# Patient Record
Sex: FEMALE | Race: WHITE | HISPANIC OR LATINO | Employment: PART TIME | URBAN - METROPOLITAN AREA
[De-identification: names, ages, dates, MRNs, and addresses within clinical notes are randomized per-mention and may not be internally consistent; named-entity substitution may affect disease eponyms.]

---

## 2017-04-18 ENCOUNTER — APPOINTMENT (OUTPATIENT)
Dept: MEDICAL GROUP | Facility: MEDICAL CENTER | Age: 42
End: 2017-04-18

## 2017-09-07 ENCOUNTER — HOSPITAL ENCOUNTER (OUTPATIENT)
Facility: MEDICAL CENTER | Age: 42
End: 2017-09-07
Attending: NURSE PRACTITIONER
Payer: COMMERCIAL

## 2017-09-07 ENCOUNTER — OFFICE VISIT (OUTPATIENT)
Dept: MEDICAL GROUP | Facility: MEDICAL CENTER | Age: 42
End: 2017-09-07
Payer: COMMERCIAL

## 2017-09-07 VITALS
RESPIRATION RATE: 16 BRPM | BODY MASS INDEX: 33.44 KG/M2 | HEIGHT: 57 IN | WEIGHT: 155 LBS | HEART RATE: 74 BPM | SYSTOLIC BLOOD PRESSURE: 142 MMHG | OXYGEN SATURATION: 98 % | DIASTOLIC BLOOD PRESSURE: 90 MMHG | TEMPERATURE: 98 F

## 2017-09-07 DIAGNOSIS — E66.9 OBESITY (BMI 30-39.9): ICD-10-CM

## 2017-09-07 DIAGNOSIS — Z01.419 ENCOUNTER FOR ANNUAL ROUTINE GYNECOLOGICAL EXAMINATION: ICD-10-CM

## 2017-09-07 DIAGNOSIS — Z12.39 ENCOUNTER FOR OTHER SCREENING FOR MALIGNANT NEOPLASM OF BREAST: ICD-10-CM

## 2017-09-07 DIAGNOSIS — R03.0 ELEVATED BLOOD PRESSURE READING: ICD-10-CM

## 2017-09-07 PROCEDURE — 88175 CYTOPATH C/V AUTO FLUID REDO: CPT

## 2017-09-07 PROCEDURE — 87624 HPV HI-RISK TYP POOLED RSLT: CPT

## 2017-09-07 PROCEDURE — 99386 PREV VISIT NEW AGE 40-64: CPT | Performed by: NURSE PRACTITIONER

## 2017-09-07 ASSESSMENT — PATIENT HEALTH QUESTIONNAIRE - PHQ9: CLINICAL INTERPRETATION OF PHQ2 SCORE: 0

## 2017-09-07 NOTE — ASSESSMENT & PLAN NOTE
Blood pressure in the office today 142/90 on initial reading, repeat of 138/87  No history of hypertension, denies family history of hypertension  She has occasional headaches but nothing severe. Denies epistaxis, chest pain, palpitations

## 2017-09-07 NOTE — PROGRESS NOTES
CC:  Pap/Well Woman Exam    History of present illness:  Angelita Coe is 41 y.o.  female presenting today for well woman exam with gynecological exam and Pap smear. Last Pap smear was about 4 years ago and normal, no history of abnormal findings. Menstrual cycle is regular, monthly, last menstrual period 2 weeks ago. She is , monogamous, has had a tubal ligation. We discussed:    .Elevated blood pressure reading  Blood pressure in the office today 142/90 on initial reading, repeat of 138/87  No history of hypertension, denies family history of hypertension  She has occasional headaches but nothing severe. Denies epistaxis, chest pain, palpitations    Obesity (BMI 30-39.9)  Feels that her diet use improvement, not doing any regular exercise    She has not had mammogram  Past Medical History:   Diagnosis Date   • H/O tubal ligation        Past Surgical History:   Procedure Laterality Date   • TUBAL COAGULATION LAPAROSCOPIC BILATERAL         Outpatient Encounter Prescriptions as of 2017   Medication Sig Dispense Refill   • [DISCONTINUED] oxycodone-acetaminophen (PERCOCET) 5-325 MG Tab Take 1-2 Tabs by mouth every four hours as needed. 20 Tab 0   • [DISCONTINUED] methylPREDNISolone (MEDROL) 4 MG Tab Take as per the package instructions. 21 Tab 0     No facility-administered encounter medications on file as of 2017.        Patient Active Problem List    Diagnosis Date Noted   • Obesity (BMI 30-39.9) 2017   • Elevated blood pressure reading 2017       .  Social History     Social History   • Marital status:      Spouse name: N/A   • Number of children: N/A   • Years of education: N/A     Occupational History   • Not on file.     Social History Main Topics   • Smoking status: Never Smoker   • Smokeless tobacco: Never Used   • Alcohol use Yes      Comment: OCC   • Drug use: No   • Sexual activity: Yes     Partners: Male     Other Topics Concern   • Not on file     Social History  "Narrative   • No narrative on file       Family History   Problem Relation Age of Onset   • Hyperlipidemia Mother          ROS: Denies Weight loss, fatigue, chest pain, SOB, bowel or bladder changes. No significant dysmenorrhea, concerning vaginal discharge or irritation, no dyspareunia or postcoital bleeding. Denies h/o migraine with aura. Denies musculoskeletal, neurological, or psychiatric problems.      /90   Pulse 74   Temp 36.7 °C (98 °F)   Resp 16   Ht 1.448 m (4' 9\")   Wt 70.3 kg (155 lb)   SpO2 98%   BMI 33.54 kg/m²     GEN:  Appears well and in no apparent distress   NECK:  Supple without adenopathy or thyromegaly  LUNGS:  Clear and equal. No wheeze, ronchi, or rales.  CV:  RRR, S1, S2. No murmur.  Pedal pulses 2+ bilaterally.  BREAST:  Symmetrical without masses. No nipple discharge.  ABD:  Soft, non-tender, non-distended, normal bowel sounds.  No hepatosplenomegaly.  :  Normal external female genitalia.  Vaginal canal clear.  Cervix appears normal. Specimen collected from transformation zone. Bimanual exam:  No CMT, normal size uterus without masses or tenderness; no adnexal masses or tenderness.      Assessment and plan    1. Encounter for annual routine gynecological examination  Normal GYN exam. Pap sent, follow-up results. Breast self-exam taught and encouraged monthly, mammogram as listed below. 2000-8 units vitamin D3 advise daily  - LIPID PROFILE; Future  - COMP METABOLIC PANEL; Future  - TSH WITH REFLEX TO FT4; Future  - THINPREP PAP WITH HPV; Future  2. Obesity (BMI 30-39.9)  - Patient identified as having weight management issue.  Appropriate orders and counseling given.  3. Elevated blood pressure reading  Borderline blood pressure readings in the office today. Encouraged to obtain a home monitor and begin checking periodically, notify me for readings persistently greater than 140 systolic  4. Encounter for other screening for malignant neoplasm of breast  - MA-SCREEN MAMMO " W/CAD-BILAT; Future      F/u pending results

## 2017-09-08 LAB
CYTOLOGY REG CYTOL: NORMAL
HPV HR 12 DNA CVX QL NAA+PROBE: NEGATIVE
HPV16 DNA SPEC QL NAA+PROBE: NEGATIVE
HPV18 DNA SPEC QL NAA+PROBE: NEGATIVE
SPECIMEN SOURCE: NORMAL

## 2017-09-11 ENCOUNTER — HOSPITAL ENCOUNTER (OUTPATIENT)
Dept: LAB | Facility: MEDICAL CENTER | Age: 42
End: 2017-09-11
Attending: NURSE PRACTITIONER
Payer: COMMERCIAL

## 2017-09-11 DIAGNOSIS — Z01.419 ENCOUNTER FOR ANNUAL ROUTINE GYNECOLOGICAL EXAMINATION: ICD-10-CM

## 2017-09-11 LAB
ALBUMIN SERPL BCP-MCNC: 4.2 G/DL (ref 3.2–4.9)
ALBUMIN/GLOB SERPL: 1.3 G/DL
ALP SERPL-CCNC: 55 U/L (ref 30–99)
ALT SERPL-CCNC: 18 U/L (ref 2–50)
ANION GAP SERPL CALC-SCNC: 8 MMOL/L (ref 0–11.9)
AST SERPL-CCNC: 14 U/L (ref 12–45)
BILIRUB SERPL-MCNC: 0.6 MG/DL (ref 0.1–1.5)
BUN SERPL-MCNC: 15 MG/DL (ref 8–22)
CALCIUM SERPL-MCNC: 9.5 MG/DL (ref 8.5–10.5)
CHLORIDE SERPL-SCNC: 103 MMOL/L (ref 96–112)
CHOLEST SERPL-MCNC: 161 MG/DL (ref 100–199)
CO2 SERPL-SCNC: 23 MMOL/L (ref 20–33)
CREAT SERPL-MCNC: 0.57 MG/DL (ref 0.5–1.4)
GFR SERPL CREATININE-BSD FRML MDRD: >60 ML/MIN/1.73 M 2
GLOBULIN SER CALC-MCNC: 3.2 G/DL (ref 1.9–3.5)
GLUCOSE SERPL-MCNC: 92 MG/DL (ref 65–99)
HDLC SERPL-MCNC: 66 MG/DL
LDLC SERPL CALC-MCNC: 81 MG/DL
POTASSIUM SERPL-SCNC: 3.9 MMOL/L (ref 3.6–5.5)
PROT SERPL-MCNC: 7.4 G/DL (ref 6–8.2)
SODIUM SERPL-SCNC: 134 MMOL/L (ref 135–145)
TRIGL SERPL-MCNC: 72 MG/DL (ref 0–149)

## 2017-09-11 PROCEDURE — 36415 COLL VENOUS BLD VENIPUNCTURE: CPT

## 2017-09-11 PROCEDURE — 84443 ASSAY THYROID STIM HORMONE: CPT

## 2017-09-11 PROCEDURE — 80053 COMPREHEN METABOLIC PANEL: CPT

## 2017-09-11 PROCEDURE — 80061 LIPID PANEL: CPT

## 2017-09-12 LAB — TSH SERPL DL<=0.005 MIU/L-ACNC: 2.1 UIU/ML (ref 0.3–3.7)

## 2018-08-29 ENCOUNTER — OFFICE VISIT (OUTPATIENT)
Dept: MEDICAL GROUP | Facility: MEDICAL CENTER | Age: 43
End: 2018-08-29
Payer: COMMERCIAL

## 2018-08-29 VITALS
OXYGEN SATURATION: 99 % | BODY MASS INDEX: 34.3 KG/M2 | WEIGHT: 159 LBS | HEIGHT: 57 IN | HEART RATE: 66 BPM | SYSTOLIC BLOOD PRESSURE: 122 MMHG | DIASTOLIC BLOOD PRESSURE: 88 MMHG | TEMPERATURE: 98.3 F | RESPIRATION RATE: 16 BRPM

## 2018-08-29 DIAGNOSIS — R03.0 ELEVATED BLOOD PRESSURE READING: ICD-10-CM

## 2018-08-29 DIAGNOSIS — Z12.31 ENCOUNTER FOR SCREENING MAMMOGRAM FOR BREAST CANCER: ICD-10-CM

## 2018-08-29 DIAGNOSIS — Z00.00 ANNUAL PHYSICAL EXAM: ICD-10-CM

## 2018-08-29 DIAGNOSIS — E66.9 OBESITY (BMI 30-39.9): ICD-10-CM

## 2018-08-29 PROCEDURE — 99396 PREV VISIT EST AGE 40-64: CPT | Performed by: NURSE PRACTITIONER

## 2018-08-29 ASSESSMENT — PATIENT HEALTH QUESTIONNAIRE - PHQ9: CLINICAL INTERPRETATION OF PHQ2 SCORE: 0

## 2018-08-29 NOTE — PROGRESS NOTES
"Chief Complaint   Patient presents with   • Stress     BLOOD PRESSURE CONCERNS/REGULAR CHECK UP     Angelita Coe is a 42 y.o. female here for annual exam. She is , has 4 children ages 13-20. She is working full-time. She recently has had some marital problems which are causing increased stress, she and her  are trying to work things out. We discussed:    Elevated blood pressure reading  Patient has a home blood pressure cuff and begin occasionally checking a few months ago. She reports home readings up to 170s systolic, this is about 6 months ago. Has not been checking recently. Has been very stressed. Mother with HTN, possibly father   Denies headaches, epistaxis, fatigue, dizziness, chest pain    Obesity (BMI 30-39.9)  Active but now planned exercise. States that her diet has been poor    Pap completed last year and normal  She has not had screening mammogram    Current medicines (including changes today)  No current outpatient prescriptions on file.     No current facility-administered medications for this visit.      She  has a past medical history of H/O tubal ligation.  She  has a past surgical history that includes tubal coagulation laparoscopic bilateral.  Social History   Substance Use Topics   • Smoking status: Never Smoker   • Smokeless tobacco: Never Used   • Alcohol use Yes      Comment: OCC     Social History     Social History Narrative   • No narrative on file     Family History   Problem Relation Age of Onset   • Hyperlipidemia Mother      Family Status   Relation Status   • Mo Alive   • Fa Alive   • Sis Alive   • Bro Alive   • Son Alive   • Dank Alive   • Dank Alive   • Dank Alive         ROS  Problems listed discussed above, all other systems reviewed and negative     Objective:     Blood pressure 122/88, pulse 66, temperature 36.8 °C (98.3 °F), resp. rate 16, height 1.448 m (4' 9\"), weight 72.1 kg (159 lb), SpO2 99 %. Body mass index is 34.41 kg/m².  Physical Exam:  General: Alert, " oriented in no acute distress.  Eye contact is good, speech is normal, affect calm  HEENT: perrl, Oral mucosa pink moist, no lesions. Nares patent. TMs gray with good landmarks bilaterally. No cervical or supraclavicular lymphadenopathy, thyroid isthmus palpable without masses or nodules.  Lungs: clear to auscultation bilaterally, good aeration, normal effort. No wheeze/ rhonchi/ rales.  CV: regular rate and rhythm, S1, S2. No murmur, no JVD, no edema. PMI at 5th intercostal space midclavicular line. Pedal pulses 2 + bilaterally  Abdomen: soft, nontender, BS x4, no hepatosplenomegaly.  Ext: color normal, vascularity normal, temperature normal. No rash or lesions.  MS: No joint swelling or redness. Strength is 5/5 globally  Neuro: DTR 2+ bilaterally  Assessment and Plan:   The following treatment plan was discussed   1. Annual physical exam  Normal physical exam. General health and wellness discussion including healthy diet, regular exercise. 2000 iu Vit d3 advised daily. Preventative health screenings as listed below. Advised regular dental cleanings, eye exam yearly. As listed below  LIPID PROFILE    COMP METABOLIC PANEL    TSH WITH REFLEX TO FT4   2. Encounter for screening mammogram for breast cancer  MA-SCREEN MAMMO W/CAD-BILAT   3. Elevated blood pressure reading  Patient checked her blood pressure at home about 6 months ago and had readings of 170 systolic. Blood pressure is normally in the office today, 122/88 checked by the medical assistant and 122/85 checked by me. Reassurance given. She may continue with home monitoring and stop by for medical assistant visit to have the accuracy of her machine checked. She may contact me with readings    4. Obesity (BMI 30-39.9)  Patient identified as having weight management issue.  Appropriate orders and counseling given.       Followup: Pending labs             Please note that this dictation was created using voice recognition software. I have worked with consultants  from the vendor as well as technical experts from [facility] to optimize the interface. I have made every reasonable attempt to correct obvious errors, but I expect that there are errors of grammar and possibly content that I did not discover before finalizing the note.

## 2018-08-29 NOTE — ASSESSMENT & PLAN NOTE
Patient has a home blood pressure cuff and begin occasionally checking a few months ago. She reports home readings up to 170s systolic, this is about 6 months ago. Has not been checking recently. Has been very stressed. Mother with HTN, possibly father   Denies headaches, epistaxis, fatigue, dizziness, chest pain

## 2018-09-12 ENCOUNTER — NON-PROVIDER VISIT (OUTPATIENT)
Dept: MEDICAL GROUP | Facility: MEDICAL CENTER | Age: 43
End: 2018-09-12
Payer: COMMERCIAL

## 2018-09-12 DIAGNOSIS — Z23 NEED FOR INFLUENZA VACCINATION: ICD-10-CM

## 2018-09-12 LAB
ALBUMIN SERPL-MCNC: 4.4 G/DL (ref 3.5–5.5)
ALBUMIN/GLOB SERPL: 1.7 {RATIO} (ref 1.2–2.2)
ALP SERPL-CCNC: 56 IU/L (ref 39–117)
ALT SERPL-CCNC: 18 IU/L (ref 0–32)
AST SERPL-CCNC: 13 IU/L (ref 0–40)
BILIRUB SERPL-MCNC: 0.4 MG/DL (ref 0–1.2)
BUN SERPL-MCNC: 15 MG/DL (ref 6–24)
BUN/CREAT SERPL: 21 (ref 9–23)
CALCIUM SERPL-MCNC: 9.6 MG/DL (ref 8.7–10.2)
CHLORIDE SERPL-SCNC: 102 MMOL/L (ref 96–106)
CHOLEST SERPL-MCNC: 156 MG/DL (ref 100–199)
CO2 SERPL-SCNC: 22 MMOL/L (ref 20–29)
CREAT SERPL-MCNC: 0.72 MG/DL (ref 0.57–1)
GLOBULIN SER CALC-MCNC: 2.6 G/DL (ref 1.5–4.5)
GLUCOSE SERPL-MCNC: 104 MG/DL (ref 65–99)
HDLC SERPL-MCNC: 56 MG/DL
IF AFRICAN AMERICAN  100797: 119 ML/MIN/1.73
IF NON AFRICAN AMER 100791: 104 ML/MIN/1.73
LABORATORY COMMENT REPORT: NORMAL
LDLC SERPL CALC-MCNC: 77 MG/DL (ref 0–99)
POTASSIUM SERPL-SCNC: 4 MMOL/L (ref 3.5–5.2)
PROT SERPL-MCNC: 7 G/DL (ref 6–8.5)
SODIUM SERPL-SCNC: 139 MMOL/L (ref 134–144)
TRIGL SERPL-MCNC: 116 MG/DL (ref 0–149)
TSH SERPL DL<=0.005 MIU/L-ACNC: 2.39 UIU/ML (ref 0.45–4.5)
VLDLC SERPL CALC-MCNC: 23 MG/DL (ref 5–40)

## 2018-09-12 PROCEDURE — 90686 IIV4 VACC NO PRSV 0.5 ML IM: CPT | Performed by: NURSE PRACTITIONER

## 2018-09-12 PROCEDURE — 90471 IMMUNIZATION ADMIN: CPT | Performed by: NURSE PRACTITIONER

## 2018-09-12 NOTE — PROGRESS NOTES
"Angelita Coe is a 42 y.o. female here for a non-provider visit for:   FLU    Reason for immunization: Annual Flu Vaccine  Immunization records indicate need for vaccine: Yes, confirmed with Epic  Minimum interval has been met for this vaccine: Yes  ABN completed: Not Indicated    Order and dose verified by: JOSE ROGERS   VIS Dated  08/07/2015 was given to patient: Yes  All IAC Questionnaire questions were answered \"No.\"    Patient tolerated injection and no adverse effects were observed or reported: Yes    Pt scheduled for next dose in series: No    "

## 2018-09-17 ENCOUNTER — HOSPITAL ENCOUNTER (OUTPATIENT)
Dept: RADIOLOGY | Facility: MEDICAL CENTER | Age: 43
End: 2018-09-17
Attending: NURSE PRACTITIONER
Payer: COMMERCIAL

## 2018-09-17 DIAGNOSIS — Z12.31 ENCOUNTER FOR SCREENING MAMMOGRAM FOR BREAST CANCER: ICD-10-CM

## 2018-09-17 PROCEDURE — 77067 SCR MAMMO BI INCL CAD: CPT

## 2019-09-20 ENCOUNTER — HOSPITAL ENCOUNTER (EMERGENCY)
Facility: MEDICAL CENTER | Age: 44
End: 2019-09-20
Attending: EMERGENCY MEDICINE
Payer: COMMERCIAL

## 2019-09-20 ENCOUNTER — APPOINTMENT (OUTPATIENT)
Dept: RADIOLOGY | Facility: MEDICAL CENTER | Age: 44
End: 2019-09-20
Attending: EMERGENCY MEDICINE
Payer: COMMERCIAL

## 2019-09-20 VITALS
HEART RATE: 87 BPM | DIASTOLIC BLOOD PRESSURE: 90 MMHG | SYSTOLIC BLOOD PRESSURE: 196 MMHG | OXYGEN SATURATION: 98 % | WEIGHT: 157.41 LBS | BODY MASS INDEX: 29.72 KG/M2 | HEIGHT: 61 IN | TEMPERATURE: 98.5 F | RESPIRATION RATE: 18 BRPM

## 2019-09-20 DIAGNOSIS — R03.0 ELEVATED BLOOD PRESSURE READING: ICD-10-CM

## 2019-09-20 LAB
ALBUMIN SERPL BCP-MCNC: 4.7 G/DL (ref 3.2–4.9)
ALBUMIN/GLOB SERPL: 1.4 G/DL
ALP SERPL-CCNC: 69 U/L (ref 30–99)
ALT SERPL-CCNC: 30 U/L (ref 2–50)
ANION GAP SERPL CALC-SCNC: 11 MMOL/L (ref 0–11.9)
AST SERPL-CCNC: 20 U/L (ref 12–45)
BASOPHILS # BLD AUTO: 0.8 % (ref 0–1.8)
BASOPHILS # BLD: 0.06 K/UL (ref 0–0.12)
BILIRUB SERPL-MCNC: 0.4 MG/DL (ref 0.1–1.5)
BUN SERPL-MCNC: 9 MG/DL (ref 8–22)
CALCIUM SERPL-MCNC: 10 MG/DL (ref 8.4–10.2)
CHLORIDE SERPL-SCNC: 104 MMOL/L (ref 96–112)
CO2 SERPL-SCNC: 27 MMOL/L (ref 20–33)
CREAT SERPL-MCNC: 0.67 MG/DL (ref 0.5–1.4)
EKG IMPRESSION: NORMAL
EOSINOPHIL # BLD AUTO: 0.11 K/UL (ref 0–0.51)
EOSINOPHIL NFR BLD: 1.5 % (ref 0–6.9)
ERYTHROCYTE [DISTWIDTH] IN BLOOD BY AUTOMATED COUNT: 43.3 FL (ref 35.9–50)
GLOBULIN SER CALC-MCNC: 3.4 G/DL (ref 1.9–3.5)
GLUCOSE SERPL-MCNC: 136 MG/DL (ref 65–99)
HCT VFR BLD AUTO: 42.3 % (ref 37–47)
HGB BLD-MCNC: 14.1 G/DL (ref 12–16)
IMM GRANULOCYTES # BLD AUTO: 0.02 K/UL (ref 0–0.11)
IMM GRANULOCYTES NFR BLD AUTO: 0.3 % (ref 0–0.9)
LYMPHOCYTES # BLD AUTO: 1.71 K/UL (ref 1–4.8)
LYMPHOCYTES NFR BLD: 22.7 % (ref 22–41)
MCH RBC QN AUTO: 29.4 PG (ref 27–33)
MCHC RBC AUTO-ENTMCNC: 33.3 G/DL (ref 33.6–35)
MCV RBC AUTO: 88.3 FL (ref 81.4–97.8)
MONOCYTES # BLD AUTO: 0.56 K/UL (ref 0–0.85)
MONOCYTES NFR BLD AUTO: 7.4 % (ref 0–13.4)
NEUTROPHILS # BLD AUTO: 5.06 K/UL (ref 2–7.15)
NEUTROPHILS NFR BLD: 67.3 % (ref 44–72)
NRBC # BLD AUTO: 0 K/UL
NRBC BLD-RTO: 0 /100 WBC
PLATELET # BLD AUTO: 324 K/UL (ref 164–446)
PMV BLD AUTO: 9.6 FL (ref 9–12.9)
POTASSIUM SERPL-SCNC: 3.4 MMOL/L (ref 3.6–5.5)
PROT SERPL-MCNC: 8.1 G/DL (ref 6–8.2)
RBC # BLD AUTO: 4.79 M/UL (ref 4.2–5.4)
SODIUM SERPL-SCNC: 142 MMOL/L (ref 135–145)
TROPONIN T SERPL-MCNC: 7 NG/L (ref 6–19)
WBC # BLD AUTO: 7.5 K/UL (ref 4.8–10.8)

## 2019-09-20 PROCEDURE — 93005 ELECTROCARDIOGRAM TRACING: CPT

## 2019-09-20 PROCEDURE — 85025 COMPLETE CBC W/AUTO DIFF WBC: CPT

## 2019-09-20 PROCEDURE — 99284 EMERGENCY DEPT VISIT MOD MDM: CPT

## 2019-09-20 PROCEDURE — 71045 X-RAY EXAM CHEST 1 VIEW: CPT

## 2019-09-20 PROCEDURE — 84484 ASSAY OF TROPONIN QUANT: CPT

## 2019-09-20 PROCEDURE — 93005 ELECTROCARDIOGRAM TRACING: CPT | Performed by: EMERGENCY MEDICINE

## 2019-09-20 PROCEDURE — 36415 COLL VENOUS BLD VENIPUNCTURE: CPT

## 2019-09-20 PROCEDURE — 80053 COMPREHEN METABOLIC PANEL: CPT

## 2019-09-20 ASSESSMENT — ENCOUNTER SYMPTOMS
GASTROINTESTINAL NEGATIVE: 1
SHORTNESS OF BREATH: 0
WEAKNESS: 0
BLOOD IN STOOL: 0
SEIZURES: 0
BRUISES/BLEEDS EASILY: 0
NECK PAIN: 0
EYE PAIN: 0
FLANK PAIN: 0
HALLUCINATIONS: 0
HEADACHES: 1
MYALGIAS: 0
BACK PAIN: 0
RESPIRATORY NEGATIVE: 1
CONSTITUTIONAL NEGATIVE: 1
FEVER: 0
SORE THROAT: 0
EYES NEGATIVE: 1
ABDOMINAL PAIN: 0
FOCAL WEAKNESS: 0

## 2019-09-20 NOTE — ED TRIAGE NOTES
Pt ambulates to triage  Chief Complaint   Patient presents with   • Hypertension     Pt states she has been intermittently checking her blood pressure ad it has been high for the past year  Pt denies pain  Pt states occasionally she feels SOB  Pt now complaints of chest pain   Pt A & 0 x 4, speech clear, ambulates well  Pt asked to wait in lobby, pt updated on triage process and pt asked to inform RN of any changes.

## 2019-09-21 NOTE — ED PROVIDER NOTES
ED Provider Note    CHIEF COMPLAINT  Chief Complaint   Patient presents with   • Hypertension   • Chest Pain       HPI  HPI     43 y.o. F p/w CC of elevated Blood pressure.  Was told of elevated BP in the past, but was not presribed medications.  No recent accidents.  Denies HA at this time.  Denies chest pain or tightness at this time.  Pt reports intermittent episodes of HA and chest tightness.  No daily ASA.  No pain at this time.   Patient denies any recent vomiting or diarrhea.  Patient denies any recent medication changes.  Patient denies any illicit substance abuse.    PMHx of tubal ligation      REVIEW OF SYSTEMS  Review of Systems   Constitutional: Negative.  Negative for fever.   HENT: Negative.  Negative for ear pain and sore throat.    Eyes: Negative.  Negative for pain.   Respiratory: Negative.  Negative for shortness of breath.    Cardiovascular: Positive for chest pain (intermittent).   Gastrointestinal: Negative.  Negative for abdominal pain and blood in stool.   Genitourinary: Negative for dysuria and flank pain.   Musculoskeletal: Negative for back pain, myalgias and neck pain.   Skin: Negative.  Negative for rash.   Neurological: Positive for headaches (intermittent). Negative for focal weakness, seizures and weakness.   Endo/Heme/Allergies: Does not bruise/bleed easily.   Psychiatric/Behavioral: Negative for hallucinations and suicidal ideas.   All other systems reviewed and are negative.      PAST MEDICAL HISTORY   has a past medical history of H/O tubal ligation.    SOCIAL HISTORY  Social History     Tobacco Use   • Smoking status: Never Smoker   • Smokeless tobacco: Never Used   Substance and Sexual Activity   • Alcohol use: Yes     Comment: OCC   • Drug use: No   • Sexual activity: Yes     Partners: Male       SURGICAL HISTORY   has a past surgical history that includes tubal coagulation laparoscopic bilateral.    CURRENT MEDICATIONS  Home Medications     Reviewed by Eloisa Alvarez R.N.  "(Registered Nurse) on 09/20/19 at 165  Med List Status: <None>   Medication Last Dose Status        Patient Arturo Taking any Medications                       ALLERGIES  No Known Allergies    PHYSICAL EXAM  VITAL SIGNS: BP (!) 196/90   Pulse 87   Temp 36.9 °C (98.5 °F) (Temporal)   Resp 18   Ht 1.549 m (5' 1\")   Wt 71.4 kg (157 lb 6.5 oz)   LMP 09/13/2019   SpO2 98%   BMI 29.74 kg/m²  @[email or web address][[id number]::@  Pulse ox interpretation: I interpret this pulse ox as normal.    Physical Exam   Constitutional: She is oriented to person, place, and time and well-developed, well-nourished, and in no distress.   HENT:   Head: Normocephalic and atraumatic.   Right Ear: External ear normal.   Left Ear: External ear normal.   Eyes: Conjunctivae and EOM are normal. No scleral icterus.   Neck: Normal range of motion.   Cardiovascular: Normal rate.   Pulmonary/Chest: Effort normal. No stridor. No respiratory distress. She has no wheezes.   Abdominal: She exhibits no distension. There is no tenderness.   Musculoskeletal: Normal range of motion. She exhibits no edema or deformity.   Neurological: She is alert and oriented to person, place, and time. Coordination normal.   Skin: Skin is warm and dry. No rash noted. No erythema.   Psychiatric: Affect and judgment normal.   5 out of 5 strength x4.  Sensation intact to light touch distally.  Patient ambulates with steady gait without assistance.    DIAGNOSTIC STUDIES / PROCEDURES    LABS/EKG  Results for orders placed or performed during the hospital encounter of 09/20/19   CBC with Differential   Result Value Ref Range    WBC 7.5 4.8 - 10.8 K/uL    RBC 4.79 4.20 - 5.40 M/uL    Hemoglobin 14.1 12.0 - 16.0 g/dL    Hematocrit 42.3 37.0 - 47.0 %    MCV 88.3 81.4 - 97.8 fL    MCH 29.4 27.0 - 33.0 pg    MCHC 33.3 (L) 33.6 - 35.0 g/dL    RDW 43.3 35.9 - 50.0 fL    Platelet Count 324 164 - 446 K/uL    MPV 9.6 9.0 - 12.9 fL    Neutrophils-Polys 67.30 44.00 - 72.00 %    Lymphocytes 22.70 22.00 - " 41.00 %    Monocytes 7.40 0.00 - 13.40 %    Eosinophils 1.50 0.00 - 6.90 %    Basophils 0.80 0.00 - 1.80 %    Immature Granulocytes 0.30 0.00 - 0.90 %    Nucleated RBC 0.00 /100 WBC    Neutrophils (Absolute) 5.06 2.00 - 7.15 K/uL    Lymphs (Absolute) 1.71 1.00 - 4.80 K/uL    Monos (Absolute) 0.56 0.00 - 0.85 K/uL    Eos (Absolute) 0.11 0.00 - 0.51 K/uL    Baso (Absolute) 0.06 0.00 - 0.12 K/uL    Immature Granulocytes (abs) 0.02 0.00 - 0.11 K/uL    NRBC (Absolute) 0.00 K/uL   Complete Metabolic Panel (CMP)   Result Value Ref Range    Sodium 142 135 - 145 mmol/L    Potassium 3.4 (L) 3.6 - 5.5 mmol/L    Chloride 104 96 - 112 mmol/L    Co2 27 20 - 33 mmol/L    Anion Gap 11.0 0.0 - 11.9    Glucose 136 (H) 65 - 99 mg/dL    Bun 9 8 - 22 mg/dL    Creatinine 0.67 0.50 - 1.40 mg/dL    Calcium 10.0 8.4 - 10.2 mg/dL    AST(SGOT) 20 12 - 45 U/L    ALT(SGPT) 30 2 - 50 U/L    Alkaline Phosphatase 69 30 - 99 U/L    Total Bilirubin 0.4 0.1 - 1.5 mg/dL    Albumin 4.7 3.2 - 4.9 g/dL    Total Protein 8.1 6.0 - 8.2 g/dL    Globulin 3.4 1.9 - 3.5 g/dL    A-G Ratio 1.4 g/dL   Troponin   Result Value Ref Range    Troponin T 7 6 - 19 ng/L   ESTIMATED GFR   Result Value Ref Range    GFR If African American >60 >60 mL/min/1.73 m 2    GFR If Non African American >60 >60 mL/min/1.73 m 2   EKG   Result Value Ref Range    Report       [facility] Emergency Dept.    Test Date:  2019  Pt Name:    BRENNA HUITRON               Department: [facility]  MRN:        [id number]                      Room:  Gender:     Female                       Technician: [id number]  :        1975                   Requested By:ER TRIAGE PROTOCOL  Order #:    [id number]                    Reading MD:    Measurements  Intervals                                Axis  Rate:       82                           P:          28  DE:         154                          QRS:        37  QRSD:       98                           T:          25  QT:          367  QTc:        429    Interpretive Statements  Sinus rhythm  No previous ECG available for comparison     12 Lead EKG interpreted by me to show:  Normal sinus rhythm  Rate 82  Axis: Normal  Intervals: Normal  Normal T waves  Normal ST segments  My impression of this EKG: No STEMI.     RADIOLOGY  DX-CHEST-PORTABLE (1 VIEW)   Final Result      No acute cardiopulmonary process is seen.           COURSE & MEDICAL DECISION MAKING  Pertinent Labs & Imaging studies reviewed by me. (See chart for details)    43 y.o. female PMH HTN p/w elevated bp.     Differential diagnosis includes but is not limited to:  Patient with known history of elevated blood pressure however she is not on antihypertensive medication at this time.  Today's visit does not represent hypertensive emergency.   called to help patient obtain outpatient close follow-up    pt denies chest pain or tightness in past week  Pt denies HA today but experienced 1 wk ago      FINAL IMPRESSION  Visit Diagnoses     ICD-10-CM   1. Elevated blood pressure reading R03.0              Electronically signed by: Jose Alejandro Knott, 9/20/2019 6:23 PM

## 2019-09-21 NOTE — ED NOTES
Pt given written and oral dc instructions. Pt verbalized understanding of all instructions given. All questions answered. VSS. Pt given f/u instructions and educated on s/s of when to return to ER. Pt ambulating independently upon time of dc in stable condition.

## 2019-09-21 NOTE — ED NOTES
Pt refusing to change into a gown at this time. Also requesting to not have IV started at this time until MD sees her. Primary RN notified.

## 2019-09-26 ENCOUNTER — OFFICE VISIT (OUTPATIENT)
Dept: MEDICAL GROUP | Facility: MEDICAL CENTER | Age: 44
End: 2019-09-26
Payer: COMMERCIAL

## 2019-09-26 VITALS
DIASTOLIC BLOOD PRESSURE: 90 MMHG | TEMPERATURE: 98.5 F | HEART RATE: 87 BPM | BODY MASS INDEX: 31.02 KG/M2 | SYSTOLIC BLOOD PRESSURE: 168 MMHG | OXYGEN SATURATION: 95 % | HEIGHT: 60 IN | WEIGHT: 158 LBS | RESPIRATION RATE: 16 BRPM

## 2019-09-26 DIAGNOSIS — Z12.39 SCREENING FOR BREAST CANCER: ICD-10-CM

## 2019-09-26 DIAGNOSIS — I10 ESSENTIAL HYPERTENSION: ICD-10-CM

## 2019-09-26 PROCEDURE — 99214 OFFICE O/P EST MOD 30 MIN: CPT | Performed by: NURSE PRACTITIONER

## 2019-09-26 RX ORDER — LISINOPRIL 10 MG/1
10 TABLET ORAL DAILY
Qty: 30 TAB | Refills: 1 | Status: SHIPPED | OUTPATIENT
Start: 2019-09-26

## 2019-09-26 ASSESSMENT — PATIENT HEALTH QUESTIONNAIRE - PHQ9: CLINICAL INTERPRETATION OF PHQ2 SCORE: 0

## 2019-09-26 NOTE — PATIENT INSTRUCTIONS
"DASH Eating Plan  DASH stands for \"Dietary Approaches to Stop Hypertension.\" The DASH eating plan is a healthy eating plan that has been shown to reduce high blood pressure (hypertension). Additional health benefits may include reducing the risk of type 2 diabetes mellitus, heart disease, and stroke. The DASH eating plan may also help with weight loss.  What do I need to know about the DASH eating plan?  For the DASH eating plan, you will follow these general guidelines:  · Choose foods with less than 150 milligrams of sodium per serving (as listed on the food label).  · Use salt-free seasonings or herbs instead of table salt or sea salt.  · Check with your health care provider or pharmacist before using salt substitutes.  · Eat lower-sodium products. These are often labeled as \"low-sodium\" or \"no salt added.\"  · Eat fresh foods. Avoid eating a lot of canned foods.  · Eat more vegetables, fruits, and low-fat dairy products.  · Choose whole grains. Look for the word \"whole\" as the first word in the ingredient list.  · Choose fish and skinless chicken or turkey more often than red meat. Limit fish, poultry, and meat to 6 oz (170 g) each day.  · Limit sweets, desserts, sugars, and sugary drinks.  · Choose heart-healthy fats.  · Eat more home-cooked food and less restaurant, buffet, and fast food.  · Limit fried foods.  · Do not still foods. Cook foods using methods such as baking, boiling, grilling, and broiling instead.  · When eating at a restaurant, ask that your food be prepared with less salt, or no salt if possible.  What foods can I eat?  Seek help from a dietitian for individual calorie needs.  Grains   Whole grain or whole wheat bread. Brown rice. Whole grain or whole wheat pasta. Quinoa, bulgur, and whole grain cereals. Low-sodium cereals. Corn or whole wheat flour tortillas. Whole grain cornbread. Whole grain crackers. Low-sodium crackers.  Vegetables   Fresh or frozen vegetables (raw, steamed, roasted, or " grilled). Low-sodium or reduced-sodium tomato and vegetable juices. Low-sodium or reduced-sodium tomato sauce and paste. Low-sodium or reduced-sodium canned vegetables.  Fruits   All fresh, canned (in natural juice), or frozen fruits.  Meat and Other Protein Products   Ground beef (85% or leaner), grass-fed beef, or beef trimmed of fat. Skinless chicken or turkey. Ground chicken or turkey. Pork trimmed of fat. All fish and seafood. Eggs. Dried beans, peas, or lentils. Unsalted nuts and seeds. Unsalted canned beans.  Dairy   Low-fat dairy products, such as skim or 1% milk, 2% or reduced-fat cheeses, low-fat ricotta or cottage cheese, or plain low-fat yogurt. Low-sodium or reduced-sodium cheeses.  Fats and Oils   Tub margarines without trans fats. Light or reduced-fat mayonnaise and salad dressings (reduced sodium). Avocado. Safflower, olive, or canola oils. Natural peanut or almond butter.  Other   Unsalted popcorn and pretzels.  The items listed above may not be a complete list of recommended foods or beverages. Contact your dietitian for more options.   What foods are not recommended?  Grains   White bread. White pasta. White rice. Refined cornbread. Bagels and croissants. Crackers that contain trans fat.  Vegetables   Creamed or fried vegetables. Vegetables in a cheese sauce. Regular canned vegetables. Regular canned tomato sauce and paste. Regular tomato and vegetable juices.  Fruits   Canned fruit in light or heavy syrup. Fruit juice.  Meat and Other Protein Products   Fatty cuts of meat. Ribs, chicken wings, gonzalez, sausage, bologna, salami, chitterlings, fatback, hot dogs, bratwurst, and packaged luncheon meats. Salted nuts and seeds. Canned beans with salt.  Dairy   Whole or 2% milk, cream, half-and-half, and cream cheese. Whole-fat or sweetened yogurt. Full-fat cheeses or blue cheese. Nondairy creamers and whipped toppings. Processed cheese, cheese spreads, or cheese curds.  Condiments   Onion and garlic  salt, seasoned salt, table salt, and sea salt. Canned and packaged gravies. Worcestershire sauce. Tartar sauce. Barbecue sauce. Teriyaki sauce. Soy sauce, including reduced sodium. Steak sauce. Fish sauce. Oyster sauce. Cocktail sauce. Horseradish. Ketchup and mustard. Meat flavorings and tenderizers. Bouillon cubes. Hot sauce. Tabasco sauce. Marinades. Taco seasonings. Relishes.  Fats and Oils   Butter, stick margarine, lard, shortening, ghee, and gonzalez fat. Coconut, palm kernel, or palm oils. Regular salad dressings.  Other   Pickles and olives. Salted popcorn and pretzels.  The items listed above may not be a complete list of foods and beverages to avoid. Contact your dietitian for more information.   Where can I find more information?  National [employer] [place]: www.nhlbi.nih.gov/health/health-topics/topics/dash/  This information is not intended to replace advice given to you by your health care provider. Make sure you discuss any questions you have with your health care provider.  Document Released: 12/06/2012 Document Revised: 05/25/2017 Document Reviewed: 10/22/2014  Elsevier Interactive Patient Education © 2017 [employer]

## 2019-09-27 NOTE — ASSESSMENT & PLAN NOTE
"Patient has had multiple borderline blood pressure readings in the past.  However, she recently had a blood pressure taken at work and it was significantly elevated.  She has a home blood pressure cuff but has not been monitoring.  She has a sense of \"pressure\" in the head but no significant headache.  She was seen recently in ER for this where her blood pressure was persistently elevated, she was not started on an antihypertensive at the time and instead directed to follow-up with primary care.  She did have normal EKG and chest x-ray  Mother has hypertension, she is unsure of her father.  She is not exercising regularly.  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  She does not use any tobacco, no illicit drugs  No chest pain, palpitations, swelling in lower extremities, activity intolerance.  Labs from recent ER visit reviewed, kidney function normal    "

## 2019-09-27 NOTE — PROGRESS NOTES
"Subjective:     Chief Complaint   Patient presents with   • Hypertension     Angelita Coe is a 43 y.o. female here today to follow up on:    Essential hypertension  Patient has had multiple borderline blood pressure readings in the past.  However, she recently had a blood pressure taken at work and it was significantly elevated.  She has a home blood pressure cuff but has not been monitoring.  She has a sense of \"pressure\" in the head but no significant headache.  She was seen recently in ER for this where her blood pressure was persistently elevated, she was not started on an antihypertensive at the time and instead directed to follow-up with primary care.  She did have normal EKG and chest x-ray  Mother has hypertension, she is unsure of her father.  She is not exercising regularly.  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  She does not use any tobacco, no illicit drugs  No chest pain, palpitations, swelling in lower extremities, activity intolerance.  Labs from recent ER visit reviewed, kidney function normal         Current medicines (including changes today)  Current Outpatient Medications   Medication Sig Dispense Refill   • lisinopril (PRINIVIL) 10 MG Tab Take 1 Tab by mouth every day. 30 Tab 1     No current facility-administered medications for this visit.      She  has a past medical history of H/O tubal ligation and Hypertension.    ROS included above     Objective:     BP (!) 168/90 (BP Location: Left arm, Patient Position: Sitting, BP Cuff Size: Adult)   Pulse 87   Temp 36.9 °C (98.5 °F) (Temporal)   Resp 16   Ht 1.524 m (5')   Wt 71.7 kg (158 lb)   SpO2 95%  Body mass index is 30.86 kg/m².     Physical Exam:  General: Alert, oriented in no acute distress.  Eye contact is good, speech is normal, affect calm  Lungs: clear to auscultation bilaterally, normal effort, no wheeze/ rhonchi/ rales.  CV: regular rate and rhythm, S1, S2, no " murmur  Ext: no edema, color normal, vascularity normal, temperature normal    Assessment and Plan:   The following treatment plan was discussed  1. Essential hypertension   patient meets criteria for hypertension.  We will start lisinopril 10 mg daily, she will check blood pressure at home over the next few days and call me early next week with readings at which time I anticipate we may need to adjust the dose.   Information given on DASH diet.  Discussed importance of cardio exercise, sodium moderation.  She does not drink much alcohol, no tobacco use, encouraged to continue avoidance  Follow-up in the office in 2 to 3 weeks   2. Screening for breast cancer  MA-SCREEN MAMMO W/CAD-BILAT       Followup: 1 week with home blood pressure readings         Please note that this dictation was created using voice recognition software. I have worked with consultants from the vendor as well as technical experts from [employer] to optimize the interface. I have made every reasonable attempt to correct obvious errors, but I expect that there are errors of grammar and possibly content that I did not discover before finalizing the note.

## 2022-09-29 ENCOUNTER — TELEPHONE (OUTPATIENT)
Dept: MEDICAL GROUP | Facility: MEDICAL CENTER | Age: 47
End: 2022-09-29
Payer: COMMERCIAL